# Patient Record
Sex: MALE | Race: WHITE
[De-identification: names, ages, dates, MRNs, and addresses within clinical notes are randomized per-mention and may not be internally consistent; named-entity substitution may affect disease eponyms.]

---

## 2021-12-29 ENCOUNTER — HOSPITAL ENCOUNTER (OUTPATIENT)
Dept: HOSPITAL 47 - RADUSWWP | Age: 46
Discharge: HOME | End: 2021-12-29
Attending: FAMILY MEDICINE
Payer: COMMERCIAL

## 2021-12-29 DIAGNOSIS — R10.11: Primary | ICD-10-CM

## 2021-12-29 PROCEDURE — 76700 US EXAM ABDOM COMPLETE: CPT

## 2021-12-29 NOTE — US
EXAMINATION TYPE: US abdomen complete

 

DATE OF EXAM: 12/29/2021

 

COMPARISON: NONE

 

CLINICAL HISTORY: R10.11 Right upper quadrant pain. RUQ pain for the past 2 months, HTN

 

EXAM MEASUREMENTS:

 

Liver Length:  17.6 cm   

Gallbladder Wall:  0.2 cm   

CBD:  0.3 cm

Spleen:  8.5 x 3.7 cm   

Right Kidney:  11.4 x 5.3 x 4.6 cm 

Left Kidney:  10.8 x 5.7 x 6.5 cm   

 

 

 

Pancreas:  Echogenic

Liver:  Increased attenuation  

Gallbladder:  wnl

**Evidence for sonographic Torrez's sign:  No

CBD:  wnl 

Spleen:  wnl   

Right Kidney:  No hydronephrosis or masses seen   

Left Kidney:  No hydronephrosis or masses seen   

Upper IVC:  wnl  

Abd Aorta:  wnl

 

Accessory spleen seen measuring 1.2 x 1.3 x 1.1cm  

 

The liver is homogenous.  The intrahepatic portion of the IVC and proximal abdominal aorta are within
 normal limits.  There is no evidence of cholelithiasis.  Common bile duct is unremarkable.  The visu
alized portions of the pancreas are homogenous.  The spleen is unremarkable.  Kidneys are symmetric a
nd free of hydronephrosis.  No renal lesions are seen.

 

IMPRESSION:

 

No significant abnormality appreciated.